# Patient Record
Sex: MALE | Race: WHITE
[De-identification: names, ages, dates, MRNs, and addresses within clinical notes are randomized per-mention and may not be internally consistent; named-entity substitution may affect disease eponyms.]

---

## 2018-07-03 ENCOUNTER — HOSPITAL ENCOUNTER (EMERGENCY)
Dept: HOSPITAL 47 - EC | Age: 58
Discharge: HOME | End: 2018-07-03
Payer: COMMERCIAL

## 2018-07-03 VITALS
TEMPERATURE: 97.5 F | RESPIRATION RATE: 15 BRPM | SYSTOLIC BLOOD PRESSURE: 98 MMHG | HEART RATE: 57 BPM | DIASTOLIC BLOOD PRESSURE: 60 MMHG

## 2018-07-03 DIAGNOSIS — J44.9: ICD-10-CM

## 2018-07-03 DIAGNOSIS — I10: ICD-10-CM

## 2018-07-03 DIAGNOSIS — Z79.51: ICD-10-CM

## 2018-07-03 DIAGNOSIS — M25.561: ICD-10-CM

## 2018-07-03 DIAGNOSIS — F17.200: ICD-10-CM

## 2018-07-03 DIAGNOSIS — M25.562: ICD-10-CM

## 2018-07-03 DIAGNOSIS — G89.29: Primary | ICD-10-CM

## 2018-07-03 DIAGNOSIS — M54.2: ICD-10-CM

## 2018-07-03 DIAGNOSIS — E78.5: ICD-10-CM

## 2018-07-03 DIAGNOSIS — Z79.899: ICD-10-CM

## 2018-07-03 DIAGNOSIS — M54.9: ICD-10-CM

## 2018-07-03 PROCEDURE — 99283 EMERGENCY DEPT VISIT LOW MDM: CPT

## 2018-07-03 PROCEDURE — 96372 THER/PROPH/DIAG INJ SC/IM: CPT

## 2018-07-03 NOTE — ED
General Adult HPI





- General


Chief complaint: Recheck/Abnormal Lab/Rx


Stated complaint: wants pain meds/neck pain


Time Seen by Provider: 07/03/18 11:11


Source: patient, RN notes reviewed


Mode of arrival: ambulatory


Limitations: no limitations





- History of Present Illness


Initial comments: 





Patient 57-year-old male presented to the emergency room today with a chief 

complaint of chronic pain.  Patient dismissed pain to his neck, back, knees.  

Patient states that he currently does not have any pain medication.  Patient 

states she's been trying follow-up family doctor's waiting to look for him get 

his prescription.  Patient states she's been using Tylenol at home.  Week.  He 

denies any injury or trauma patient denies any gallbladder incontinence times.  

Denies any saddle anesthesia.  Denies any other complaints or symptoms. Patient 

denies any recent fever, chills, shortness of breath, chest pain, abdominal pain

, nausea or vomiting, numbness or tingling, headaches or visual changes, or any 

other complaints.





- Related Data


 Home Medications











 Medication  Instructions  Recorded  Confirmed


 


ALPRAZolam [Xanax] 0.5 mg PO TID PRN 07/03/18 07/03/18


 


Atenolol/Chlorthalidone 1 tab PO DAILY 07/03/18 07/03/18





[Atenolol-Chlorthalidone 100-25]   


 


Atorvastatin [Lipitor] 80 mg PO HS 07/03/18 07/03/18


 


Ergocalciferol [Vitamin D2] 50,000 unit PO Q30D 07/03/18 07/03/18


 


Mometasone/Formoterol [Dulera 200 2 puff INHALATION RT-BID 07/03/18 07/03/18





Mcg/5 Mcg Inhaler]   








 Previous Rx's











 Medication  Instructions  Recorded


 


Dexamethasone 0.75 mg PO AS DIRECTED #12 tablet 07/03/18











 Allergies











Allergy/AdvReac Type Severity Reaction Status Date / Time


 


No Known Allergies Allergy   Verified 07/03/18 10:41














Review of Systems


ROS Statement: 


Those systems with pertinent positive or pertinent negative responses have been 

documented in the HPI.





ROS Other: All systems not noted in ROS Statement are negative.





Past Medical History


Past Medical History: COPD, Hyperlipidemia, Hypertension


Additional Past Medical History / Comment(s): chronic back pain


History of Any Multi-Drug Resistant Organisms: None Reported


Past Surgical History: Orthopedic Surgery


Past Psychological History: Anxiety


Smoking Status: Current every day smoker


Past Alcohol Use History: Rare


Past Drug Use History: None Reported





General Exam





- General Exam Comments


Initial Comments: 





General:  The patient is awake and alert, in no distress, and does not appear 

acutely ill. 


Eye:  Pupils are equal, round and reactive to light, extra-ocular movements are 

intact.  No nystagmus.  There is normal conjunctiva bilaterally.  No signs of 

icterus.  


Ears, nose, mouth and throat:  There are moist mucous membranes and no oral 

lesions. 


Neck:  The neck is supple, there is no tenderness or JVD.  


Cardiovascular:  There is a regular rate and rhythm. No murmur, rub or gallop 

is appreciated.


Respiratory:  Lungs are clear to auscultation, respirations are non-labored, 

breath sounds are equal.  No wheezes, stridor, rales, or rhonchi.


Musculoskeletal:  Normal ROM, no tenderness.  Strength 5/5. Sensation intact. 

Pulses equal bilaterally 2+.  


Neurological:  A&O x 3. CN II-XII intact, There are no obvious motor or sensory 

deficits. Coordination appears grossly intact. Speech is normal.


Skin:  Skin is warm and dry and no rashes or lesions are noted. 


Psychiatric:  Cooperative, appropriate mood & affect, normal judgment.  


Limitations: no limitations





Course





 Vital Signs











  07/03/18





  10:37


 


Temperature 98 F


 


Pulse Rate 66


 


Respiratory 18





Rate 


 


Blood Pressure 125/78


 


O2 Sat by Pulse 98





Oximetry 














Medical Decision Making





- Medical Decision Making





The patient admits that this is chronic pain.  States she's been trying follow-

up family doctor for pain medication.  Patient states he was taking Percocet 

for the past.  Advised patient that we cannot give him any narcotics here.  

Patient agreeable to Toradol shot.  Will be given short course of steroids for 

his pain.





Disposition


Clinical Impression: 


 Chronic pain





Disposition: HOME SELF-CARE


Condition: Good


Instructions:  Chronic Pain (ED)


Additional Instructions: 


Please use medication as discussed.  Please follow-up with family doctor in the 

next 2 days of symptoms have not improved.  Please return to emergency room if 

the symptoms increase or worsen or for any other concerns.


Prescriptions: 


Dexamethasone 0.75 mg PO AS DIRECTED #12 tablet


Is patient prescribed a controlled substance at d/c from ED?: No


Referrals: 


Jeffrey Daniels Jr,  [Primary Care Provider] - 1-2 days


Time of Disposition: 11:27

## 2018-10-01 ENCOUNTER — HOSPITAL ENCOUNTER (EMERGENCY)
Dept: HOSPITAL 47 - EC | Age: 58
Discharge: HOME | End: 2018-10-01
Payer: COMMERCIAL

## 2018-10-01 VITALS — DIASTOLIC BLOOD PRESSURE: 84 MMHG | RESPIRATION RATE: 14 BRPM | SYSTOLIC BLOOD PRESSURE: 131 MMHG | HEART RATE: 57 BPM

## 2018-10-01 VITALS — TEMPERATURE: 98.3 F

## 2018-10-01 DIAGNOSIS — Z79.899: ICD-10-CM

## 2018-10-01 DIAGNOSIS — J44.9: ICD-10-CM

## 2018-10-01 DIAGNOSIS — E87.6: Primary | ICD-10-CM

## 2018-10-01 DIAGNOSIS — R19.7: ICD-10-CM

## 2018-10-01 DIAGNOSIS — F17.200: ICD-10-CM

## 2018-10-01 DIAGNOSIS — F41.9: ICD-10-CM

## 2018-10-01 DIAGNOSIS — E86.0: ICD-10-CM

## 2018-10-01 DIAGNOSIS — I10: ICD-10-CM

## 2018-10-01 LAB
ALBUMIN SERPL-MCNC: 4.4 G/DL (ref 3.5–5)
ALP SERPL-CCNC: 67 U/L (ref 38–126)
ALT SERPL-CCNC: 100 U/L (ref 21–72)
AMYLASE SERPL-CCNC: 69 U/L (ref 30–110)
ANION GAP SERPL CALC-SCNC: 13 MMOL/L
APTT BLD: 25.8 SEC (ref 22–30)
AST SERPL-CCNC: 71 U/L (ref 17–59)
BASOPHILS # BLD AUTO: 0.1 K/UL (ref 0–0.2)
BASOPHILS NFR BLD AUTO: 1 %
BUN SERPL-SCNC: 7 MG/DL (ref 9–20)
CALCIUM SPEC-MCNC: 9.8 MG/DL (ref 8.4–10.2)
CHLORIDE SERPL-SCNC: 87 MMOL/L (ref 98–107)
CO2 SERPL-SCNC: 26 MMOL/L (ref 22–30)
EOSINOPHIL # BLD AUTO: 1.5 K/UL (ref 0–0.7)
EOSINOPHIL NFR BLD AUTO: 10 %
ERYTHROCYTE [DISTWIDTH] IN BLOOD BY AUTOMATED COUNT: 4.82 M/UL (ref 4.3–5.9)
ERYTHROCYTE [DISTWIDTH] IN BLOOD: 11.9 % (ref 11.5–15.5)
GLUCOSE SERPL-MCNC: 108 MG/DL (ref 74–99)
HCT VFR BLD AUTO: 44.2 % (ref 39–53)
HGB BLD-MCNC: 16.1 GM/DL (ref 13–17.5)
INR PPP: 1.1 (ref ?–1.2)
LIPASE SERPL-CCNC: 90 U/L (ref 23–300)
LYMPHOCYTES # SPEC AUTO: 3.1 K/UL (ref 1–4.8)
LYMPHOCYTES NFR SPEC AUTO: 21 %
MCH RBC QN AUTO: 33.4 PG (ref 25–35)
MCHC RBC AUTO-ENTMCNC: 36.5 G/DL (ref 31–37)
MCV RBC AUTO: 91.6 FL (ref 80–100)
MONOCYTES # BLD AUTO: 1.4 K/UL (ref 0–1)
MONOCYTES NFR BLD AUTO: 10 %
NEUTROPHILS # BLD AUTO: 8.5 K/UL (ref 1.3–7.7)
NEUTROPHILS NFR BLD AUTO: 57 %
PLATELET # BLD AUTO: 301 K/UL (ref 150–450)
POTASSIUM SERPL-SCNC: 2.8 MMOL/L (ref 3.5–5.1)
PROT SERPL-MCNC: 7.7 G/DL (ref 6.3–8.2)
PT BLD: 11 SEC (ref 9–12)
SODIUM SERPL-SCNC: 126 MMOL/L (ref 137–145)
WBC # BLD AUTO: 14.7 K/UL (ref 3.8–10.6)

## 2018-10-01 PROCEDURE — 85025 COMPLETE CBC W/AUTO DIFF WBC: CPT

## 2018-10-01 PROCEDURE — 82150 ASSAY OF AMYLASE: CPT

## 2018-10-01 PROCEDURE — 99285 EMERGENCY DEPT VISIT HI MDM: CPT

## 2018-10-01 PROCEDURE — 80053 COMPREHEN METABOLIC PANEL: CPT

## 2018-10-01 PROCEDURE — 96366 THER/PROPH/DIAG IV INF ADDON: CPT

## 2018-10-01 PROCEDURE — 83735 ASSAY OF MAGNESIUM: CPT

## 2018-10-01 PROCEDURE — 96361 HYDRATE IV INFUSION ADD-ON: CPT

## 2018-10-01 PROCEDURE — 96365 THER/PROPH/DIAG IV INF INIT: CPT

## 2018-10-01 PROCEDURE — 85730 THROMBOPLASTIN TIME PARTIAL: CPT

## 2018-10-01 PROCEDURE — 83690 ASSAY OF LIPASE: CPT

## 2018-10-01 PROCEDURE — 96375 TX/PRO/DX INJ NEW DRUG ADDON: CPT

## 2018-10-01 PROCEDURE — 36415 COLL VENOUS BLD VENIPUNCTURE: CPT

## 2018-10-01 PROCEDURE — 74177 CT ABD & PELVIS W/CONTRAST: CPT

## 2018-10-01 PROCEDURE — 85610 PROTHROMBIN TIME: CPT

## 2018-10-01 NOTE — CT
EXAMINATION TYPE: CT abdomen pelvis w con

 

DATE OF EXAM: 10/1/2018

 

COMPARISON: None

 

HISTORY: rlq pain

 

CT DLP: 1237 mGycm

Automated exposure control for dose reduction was used.

 

TECHNIQUE:  Helical acquisition of images from the lung bases through the pelvis have been completed.


 

CONTRAST: 

Performed without Oral Contrast and with IV Contrast, patient injected with 100 mL of Isovue 300.

 

FINDINGS: 

 

LUNG BASES: No significant abnormality is appreciated.

 

AORTA:  No significant abnormality is appreciated.

 

LIVER/GB: Liver shows low attenuation possibly due to hepatic steatosis, gallbladder is normal.

 

PANCREAS: No significant abnormality is seen.

 

SPLEEN: No significant abnormality is seen.

 

ADRENALS: No significant abnormality is seen.

 

KIDNEYS: No significant abnormality is seen.

 

 

REPRODUCTIVE ORGANS: No significant abnormality is seen

 

BOWEL:  Diverticular changes associated with the sigmoid colon. The appendix is normal. Small umbilic
al hernia contains fat.

 

FREE AIR:  No Free Air visible.

 

ASCITES:  None visible.

 

PELVIC ADENOPATHY:  None visualized.

 

RETROPERITONEAL ADENOPATHY:  No Retroperitoneal Adenopathy visible.

 

URINARY BLADDER:  No significant abnormality is seen.

 

OSSEOUS STRUCTURES:  Degenerative disc changes are noted in the visualized lumbar spine, spinal curva
ture. Vacuum phenomenon present at L5-S1, there is lucency posterior to the S1 vertebral body compati
ble with disc herniation.

 

IMPRESSION: 

DIVERTICULOSIS. DEGENERATIVE DISC DISEASE AND DISC HERNIATION L5-S1. HEPATIC STEATOSIS. ADDITIONAL FI
NDINGS ABOVE.

## 2018-10-01 NOTE — ED
Abdominal Pain HPI





<Stone Palafox - Last Filed: 10/01/18 12:30>





- General


Source: patient, RN notes reviewed, old records reviewed


Mode of arrival: wheelchair


Limitations: no limitations





<Racheal Christianson - Last Filed: 10/03/18 12:04>





- General


Chief Complaint: Abdominal Pain


Stated Complaint: Abdominal pain


Time Seen by Provider: 10/01/18 09:47





- History of Present Illness


Initial Comments: 





58-year-old male presents emergency department today with chief complaint of 

diffuse abdominal pain for the past 2 weeks.  He saw his PCP and initially 

thought the pain was related to diverticulosis and withdrawal from opiate pain 

medication.  He recently restarted his opiate pain medication 3 days ago.  He 

states that he was on Cipro and Flagyl but stopped that earlier this week 

because he felt like it was not working.  Patient reports is also supposed to 

surround himself himself to skilled nursing today. (Racheal Christianson)





- Related Data


 Home Medications











 Medication  Instructions  Recorded  Confirmed


 


Atenolol/Chlorthalidone 1 tab PO DAILY 02/08/18 10/01/18





[Atenolol-Chlorthalidone 100-25]   


 


Mometasone/Formoterol [Dulera 200 2 puff INHALATION RT-BID PRN 07/03/18 07/03/18





Mcg/5 Mcg Inhaler]   


 


Lisinopril [Zestril] 10 mg PO DAILY 10/01/18 10/01/18


 


Sertraline [Zoloft] 100 mg PO DAILY 10/01/18 10/01/18


 


oxyCODONE-APAP 7.5-325MG [Percocet 1 tab PO BID PRN 10/01/18 10/01/18





7.5-325 mg]   








 Previous Rx's











 Medication  Instructions  Recorded


 


Potassium Chloride ER [K-Dur 20] 20 meq PO TID #6 tab 10/01/18











 Allergies











Allergy/AdvReac Type Severity Reaction Status Date / Time


 


No Known Allergies Allergy   Verified 10/01/18 09:41














Review of Systems


ROS Other: All systems not noted in ROS Statement are negative.





<Stone Palafox - Last Filed: 10/01/18 12:30>


ROS Other: All systems not noted in ROS Statement are negative.





<Racheal hCristianson - Last Filed: 10/03/18 12:04>


ROS Statement: 


Those systems with pertinent positive or pertinent negative responses have been 

documented in the HPI.








Past Medical History


Past Medical History: COPD, Hyperlipidemia, Hypertension


Additional Past Medical History / Comment(s): chronic back pain


History of Any Multi-Drug Resistant Organisms: None Reported


Past Surgical History: Orthopedic Surgery


Past Psychological History: Anxiety


Smoking Status: Current every day smoker


Past Alcohol Use History: Rare


Past Drug Use History: None Reported





<Racheal Christianson - Last Filed: 10/03/18 12:04>





General Exam





<Stone Palafox - Last Filed: 10/01/18 12:30>


Limitations: no limitations


General appearance: alert, in no apparent distress


Head exam: Present: atraumatic, normocephalic, normal inspection


Eye exam: Present: normal appearance, PERRL, EOMI.  Absent: scleral icterus, 

conjunctival injection, periorbital swelling


ENT exam: Present: normal exam, mucous membranes moist


Neck exam: Present: normal inspection.  Absent: tenderness, meningismus, 

lymphadenopathy


Respiratory exam: Present: normal lung sounds bilaterally.  Absent: respiratory 

distress, wheezes, rales, rhonchi, stridor


Cardiovascular Exam: Present: regular rate, normal rhythm, normal heart sounds.

  Absent: systolic murmur, diastolic murmur, rubs, gallop, clicks


GI/Abdominal exam: Present: soft, tenderness (Left upper quadrant and right 

upper quadrant abdominal tenderness.), normal bowel sounds.  Absent: distended, 

guarding, rebound, rigid


Extremities exam: Present: normal inspection, full ROM, normal capillary 

refill.  Absent: tenderness, pedal edema, joint swelling, calf tenderness


Back exam: Present: normal inspection


Neurological exam: Present: alert, oriented X3, CN II-XII intact


Psychiatric exam: Present: normal affect, normal mood


Skin exam: Present: warm, dry, intact, normal color.  Absent: rash





<Racheal Christianson - Last Filed: 10/03/18 12:04>





- General Exam Comments


Initial Comments: 





38-year-old male.  Alert and oriented.  No significant distress. (Racheal Christianson)





Course





<Stone Palafox - Last Filed: 10/01/18 12:30>





<Racheal Christianson - Last Filed: 10/03/18 12:04>


 Vital Signs











  10/01/18 10/01/18 10/01/18





  09:11 12:27 13:21


 


Temperature 98.3 F  


 


Pulse Rate 67 66 57 L


 


Respiratory 16 16 14





Rate   


 


Blood Pressure 132/88 122/81 131/84


 


O2 Sat by Pulse 97 96 95





Oximetry   














- Reevaluation(s)


Reevaluation #1: 





10/01/18 12:30


Patient reevaluated by myself, Dr. Palafox.  Patient had mild discomfort left 

lower abdomen.  Computed tomography scan done.  Patient updated on results.  

Case was discussed in detail with Dr. Stromberg who is okay with patient going 

home following medication and fluids.  He states he can follow up with patient 

tomorrow for repeat blood work and patient can be discharged.  Patient is 

comfortable with this. (Stone Palafox)





Medical Decision Making





- Lab Data


Result diagrams: 


 10/01/18 10:35





 10/01/18 10:35





<Stone Palafox - Last Filed: 10/01/18 12:30>





- Lab Data


Result diagrams: 


 10/01/18 10:35





 10/01/18 10:35





- Radiology Data


Radiology results: report reviewed





<Racheal Christianson - Last Filed: 10/03/18 12:04>





- Medical Decision Making


58-year-old male presents emergency department today with chief complaint of 

diffuse abdominal pain for the past 2 weeks.  He saw his PCP and initially 

thought the pain was related to diverticulosis and withdrawal from opiate pain 

medication.  He recently restarted his opiate pain medication 3 days ago. 

Patient labs do show evdience of electrolyte abnormalities, low potassium and 

sodium. Bilirubin is mildly elevated with WBC elevation as well. PAtient had cT 

scan today, negative for acute process. Patient case discussed with PCP. 

REcommends follow up outpatient and have patient on oral potassium replacement. 

REturn parameters discussed. 


 (Racheal Christianson)





- Lab Data


 Lab Results











  10/01/18 10/01/18 10/01/18 Range/Units





  10:35 10:35 10:35 


 


WBC   14.7 H   (3.8-10.6)  k/uL


 


RBC   4.82   (4.30-5.90)  m/uL


 


Hgb   16.1   (13.0-17.5)  gm/dL


 


Hct   44.2   (39.0-53.0)  %


 


MCV   91.6   (80.0-100.0)  fL


 


MCH   33.4   (25.0-35.0)  pg


 


MCHC   36.5   (31.0-37.0)  g/dL


 


RDW   11.9   (11.5-15.5)  %


 


Plt Count   301   (150-450)  k/uL


 


Neutrophils %   57   %


 


Lymphocytes %   21   %


 


Monocytes %   10   %


 


Eosinophils %   10   %


 


Basophils %   1   %


 


Neutrophils #   8.5 H   (1.3-7.7)  k/uL


 


Lymphocytes #   3.1   (1.0-4.8)  k/uL


 


Monocytes #   1.4 H   (0-1.0)  k/uL


 


Eosinophils #   1.5 H   (0-0.7)  k/uL


 


Basophils #   0.1   (0-0.2)  k/uL


 


PT    11.0  (9.0-12.0)  sec


 


INR    1.1  (<1.2)  


 


APTT    25.8  (22.0-30.0)  sec


 


Sodium  126 L    (137-145)  mmol/L


 


Potassium  2.8 L    (3.5-5.1)  mmol/L


 


Chloride  87 L    ()  mmol/L


 


Carbon Dioxide  26    (22-30)  mmol/L


 


Anion Gap  13    mmol/L


 


BUN  7 L    (9-20)  mg/dL


 


Creatinine  0.60 L    (0.66-1.25)  mg/dL


 


Est GFR (CKD-EPI)AfAm  >90    (>60 ml/min/1.73 sqM)  


 


Est GFR (CKD-EPI)NonAf  >90    (>60 ml/min/1.73 sqM)  


 


Glucose  108 H    (74-99)  mg/dL


 


Calcium  9.8    (8.4-10.2)  mg/dL


 


Magnesium     (1.6-2.3)  mg/dL


 


Total Bilirubin  1.9 H    (0.2-1.3)  mg/dL


 


AST  71 H    (17-59)  U/L


 


ALT  100 H    (21-72)  U/L


 


Alkaline Phosphatase  67    ()  U/L


 


Total Protein  7.7    (6.3-8.2)  g/dL


 


Albumin  4.4    (3.5-5.0)  g/dL


 


Amylase  69    ()  U/L


 


Lipase  90    ()  U/L














  10/01/18 Range/Units





  10:35 


 


WBC   (3.8-10.6)  k/uL


 


RBC   (4.30-5.90)  m/uL


 


Hgb   (13.0-17.5)  gm/dL


 


Hct   (39.0-53.0)  %


 


MCV   (80.0-100.0)  fL


 


MCH   (25.0-35.0)  pg


 


MCHC   (31.0-37.0)  g/dL


 


RDW   (11.5-15.5)  %


 


Plt Count   (150-450)  k/uL


 


Neutrophils %   %


 


Lymphocytes %   %


 


Monocytes %   %


 


Eosinophils %   %


 


Basophils %   %


 


Neutrophils #   (1.3-7.7)  k/uL


 


Lymphocytes #   (1.0-4.8)  k/uL


 


Monocytes #   (0-1.0)  k/uL


 


Eosinophils #   (0-0.7)  k/uL


 


Basophils #   (0-0.2)  k/uL


 


PT   (9.0-12.0)  sec


 


INR   (<1.2)  


 


APTT   (22.0-30.0)  sec


 


Sodium   (137-145)  mmol/L


 


Potassium   (3.5-5.1)  mmol/L


 


Chloride   ()  mmol/L


 


Carbon Dioxide   (22-30)  mmol/L


 


Anion Gap   mmol/L


 


BUN   (9-20)  mg/dL


 


Creatinine   (0.66-1.25)  mg/dL


 


Est GFR (CKD-EPI)AfAm   (>60 ml/min/1.73 sqM)  


 


Est GFR (CKD-EPI)NonAf   (>60 ml/min/1.73 sqM)  


 


Glucose   (74-99)  mg/dL


 


Calcium   (8.4-10.2)  mg/dL


 


Magnesium  1.7  (1.6-2.3)  mg/dL


 


Total Bilirubin   (0.2-1.3)  mg/dL


 


AST   (17-59)  U/L


 


ALT   (21-72)  U/L


 


Alkaline Phosphatase   ()  U/L


 


Total Protein   (6.3-8.2)  g/dL


 


Albumin   (3.5-5.0)  g/dL


 


Amylase   ()  U/L


 


Lipase   ()  U/L














- Radiology Data


CT shows diverticulosis, hepatic steatosis. Degenerative disc of L5.  (Racheal Christianson)





Disposition





<Stone Palafox - Last Filed: 10/01/18 12:30>


Is patient prescribed a controlled substance at d/c from ED?: No


Time of Disposition: 13:10





<Racheal Christianson - Last Filed: 10/03/18 12:04>


Clinical Impression: 


 Hypokalemia, Diarrhea, Dehydration





Disposition: HOME SELF-CARE


Condition: Good


Instructions:  Dehydration (ED)


Additional Instructions: 


Patient has follow-up with primary care provider and get labs rechecked.  

Return to emergency department if any alarming signs or symptoms occur.


Prescriptions: 


Potassium Chloride ER [K-Dur 20] 20 meq PO TID #6 tab


Referrals: 


Jeffrey Daniels Jr,  [Primary Care Provider] - 1-2 days